# Patient Record
Sex: MALE | Race: WHITE | NOT HISPANIC OR LATINO | URBAN - METROPOLITAN AREA
[De-identification: names, ages, dates, MRNs, and addresses within clinical notes are randomized per-mention and may not be internally consistent; named-entity substitution may affect disease eponyms.]

---

## 2020-10-16 ENCOUNTER — OFFICE VISIT (OUTPATIENT)
Dept: URBAN - METROPOLITAN AREA CLINIC 19 | Facility: CLINIC | Age: 70
End: 2020-10-16
Payer: MEDICARE

## 2020-10-16 DIAGNOSIS — K74.60 UNSPECIFIED CIRRHOSIS OF LIVER: ICD-10-CM

## 2020-10-16 DIAGNOSIS — R15.9 FULL INCONTINENCE OF FECES: ICD-10-CM

## 2020-10-16 DIAGNOSIS — Z94.4 HISTORY OF LIVER TRANSPLANT: ICD-10-CM

## 2020-10-16 PROBLEM — 1086911000119107: Status: ACTIVE | Noted: 2020-10-16

## 2020-10-16 PROBLEM — 19943007: Status: ACTIVE | Noted: 2020-10-16

## 2020-10-16 PROCEDURE — G8417 CALC BMI ABV UP PARAM F/U: HCPCS | Performed by: INTERNAL MEDICINE

## 2020-10-16 PROCEDURE — 99213 OFFICE O/P EST LOW 20 MIN: CPT | Performed by: INTERNAL MEDICINE

## 2020-10-16 PROCEDURE — G8427 DOCREV CUR MEDS BY ELIG CLIN: HCPCS | Performed by: INTERNAL MEDICINE

## 2020-10-16 PROCEDURE — G9903 PT SCRN TBCO ID AS NON USER: HCPCS | Performed by: INTERNAL MEDICINE

## 2020-10-16 PROCEDURE — 3017F COLORECTAL CA SCREEN DOC REV: CPT | Performed by: INTERNAL MEDICINE

## 2020-10-16 PROCEDURE — G8484 FLU IMMUNIZE NO ADMIN: HCPCS | Performed by: INTERNAL MEDICINE

## 2020-10-16 RX ORDER — ATORVASTATIN CALCIUM 20 MG/1
1 TABLET TABLET, FILM COATED ORAL ONCE A DAY
Status: ACTIVE | COMMUNITY

## 2020-10-16 RX ORDER — DICYCLOMINE HYDROCHLORIDE 10 MG/1
1 CAPSULE CAPSULE ORAL BID
Qty: 60 CAPSULE | Refills: 5 | OUTPATIENT
Start: 2020-10-16 | End: 2021-04-14

## 2020-10-16 RX ORDER — SPIRONOLACTONE 25 MG/1
1 TABLET TABLET, FILM COATED ORAL
Status: ACTIVE | COMMUNITY

## 2020-10-16 RX ORDER — LOPERAMIDE HCL 2 MG/1
2 TABLETS TABLET, FILM COATED ORAL DAILY
Qty: 60 TABLET | Refills: 5 | OUTPATIENT
Start: 2020-10-16 | End: 2021-04-14

## 2020-10-16 RX ORDER — WHEAT DEXTRIN 3 G/3.5 G
2 SCOOPS POWDER (GRAM) ORAL DAILY
Qty: 1 CONTAINER | Refills: 5 | OUTPATIENT
Start: 2020-10-16 | End: 2021-04-14

## 2020-10-16 RX ORDER — TRAZODONE HYDROCHLORIDE 100 MG/1
TABLET, FILM COATED ORAL
Qty: 0 | Refills: 0 | Status: ACTIVE | COMMUNITY
Start: 1900-01-01

## 2020-10-16 RX ORDER — FINASTERIDE 5 MG/1
TAKE 1 TABLET (5 MG) BY ORAL ROUTE ONCE DAILY TABLET, FILM COATED ORAL 1
Qty: 0 | Refills: 0 | Status: ACTIVE | COMMUNITY
Start: 1900-01-01

## 2020-10-16 NOTE — HPI-TODAY'S VISIT:
17: Records reviewed. Patient has a complicated history, which took a while to review. He has had issues with obesity, and he underwent a philly-en-Y gastric bypass in , at which time he lost 150 pounds. His weight has fluctuated slightly, but he has maintained a reasonable weight since then. He has had problems with a ventral hernia with some complications after repair in 2016. He was hospitalized in 2017 for an anterior abdominal wall mass that was drained twice. Dr. Agustin Hoffman is managing that problem. He has mesh over the majority of his upper abdomen. He has chronic loose stools, which may be related to his gastric bypass surgery - an extensive evaluation has been done previously by Dr. Raffy Thayer. He last saw him in 2017, but he has switched his care over to me today due to convenient location. He also has seen Dr. Akash uHssein in my practice for screening/surveillance colonoscopies. His last colonoscopy was in , and he had one tubular adenoma removed. He is due for a surveillance colonoscopy at this time. In addition, he has HDZ/cryptogenic cirrhosis. He had a liver biopsy on 13 that was consistent with that diagnosis since previous laboratory testing was inconclusive. Of note, his mother  of liver disease. He has no signs of decompensation, but he has persistent thrombocytopenia/anemia. He needs screening for HCC and esophageal varices at this time. Last EGD in  did not show any evidence of portal hypertension.   18: Patient presents for a new problem - fecal incontinence. He states that for months (even before seeing me), he would have the urge to go to the bathroom but could not make it in time. Stools are usually loose or watery. May have some malabsorption. Needs liver cancer screening and hepatitis A/B vaccination.   18: Patient returns for urgent consultation. Apparently, the patient became more confused about a week after seeing me. He was slurring his speech and acting very lethargic. At the same time, he was irritable and belligerent, which apparently "not like him", according to his wife and daughter (who works in Dr. Frankel's office). He was taking naps during the day, and his driving became erratic. (No longer driving at this time.) He went to the ER twice, and he was given a rx for Xifaxan which has helped him immensely. No signs of liver cancer, ascites/SBP, or bleeding. Not sure why he became encephalopathic - awake and alert in my office.   3/26/18: Patient returns for follow-up. He was admitted from 3/3/18-3/5/18 for another exacerbation of hepatic encephalopathy. Resolved quickly with lactulose and rifaximin. He appears clear-headed today. He has swollen legs and mildly distended abdomen. Needs refills of meds. On 3/9/18, he went to see Dr. Gunner Escalante at [facility]. He is being considered by possible liver transplant.   18: Patient returns for follow-up. He feels fine except for increased swelling in his legs. Does not want to use compression stockings. Willing to try to increase diuretics with close monitoring. Seeing [facility] next month. Not listed yet - needs cardiac MRI and reimaging of a lung nodule in a few months. Up to date with colon cancer screening - I found one hyperplastic polyp and one adenomatous polyp on his last colonoscopy in 2017. I also performed an EGD at that time which did not show any varices.   18: Patient presents for follow-up of HDZ cirrhosis. On 8/10/18, I performed a screening EGD that showed evidence of a gastric bypass but no portal hypertension. Followed by Dr. Ken Escalante at [facility] - currently listed with MELD-Na of 16. Encephalopathy seems to be the main sign of decompensation. Needs HCC screening. Was vaccinated for hepatitis B. Feels tired but otherwise well.   19: Patient had a liver transplant on 3/12/19 at [facility] and follows up with Dr. Escalante. Doing amazingly well. He has followed up with hematology who were concerned about his chronic anemia (macrocytic). He has had a gastric bypass and has a chronic anemia due to chronic iron malabsorption. However, he was found to have a hemoccult positive stool. Will rule out GI blood loss.  10/16/20:  Patient presents for urgent evaluation of diarrhea.  Patient had an EGD/colonoscopy on 19 that revealed a normal philly-en-Y anatomy as well as benign lipomas in the colon without any other abnormalities.  Patient mainly is having issues with fecal incontinence, almost every other day.  He wears an adult undergarment.  It is not clear if he may have altered sensation - he just cannot make it to the commode in time.  His stools are loose - he believes that he submitted a stool sample recently that was negative for infection.  From a liver standpoint, he is still followed closely by Dr. Gunner Escalante.  He has no issues there, but his current list of meds still includes furosemide, spironolactone, lactulose, and Xifaxan.  He is not sure what pills he takes, but he does not take the lactulose anymore.  Will confirm with Dr. Escalante.

## 2020-12-15 ENCOUNTER — OFFICE VISIT (OUTPATIENT)
Dept: URBAN - METROPOLITAN AREA CLINIC 19 | Facility: CLINIC | Age: 70
End: 2020-12-15

## 2021-03-23 ENCOUNTER — TELEPHONE ENCOUNTER (OUTPATIENT)
Dept: URBAN - METROPOLITAN AREA CLINIC 19 | Facility: CLINIC | Age: 71
End: 2021-03-23

## 2021-04-02 ENCOUNTER — TELEPHONE ENCOUNTER (OUTPATIENT)
Dept: URBAN - METROPOLITAN AREA CLINIC 23 | Facility: CLINIC | Age: 71
End: 2021-04-02

## 2021-04-06 ENCOUNTER — TELEPHONE ENCOUNTER (OUTPATIENT)
Dept: URBAN - METROPOLITAN AREA CLINIC 19 | Facility: CLINIC | Age: 71
End: 2021-04-06

## 2021-04-18 PROBLEM — 161671001: Status: ACTIVE | Noted: 2020-10-16

## 2021-04-29 ENCOUNTER — WEB ENCOUNTER (OUTPATIENT)
Dept: URBAN - METROPOLITAN AREA CLINIC 19 | Facility: CLINIC | Age: 71
End: 2021-04-29

## 2021-04-29 ENCOUNTER — OFFICE VISIT (OUTPATIENT)
Dept: URBAN - METROPOLITAN AREA CLINIC 19 | Facility: CLINIC | Age: 71
End: 2021-04-29
Payer: MEDICARE

## 2021-04-29 VITALS
HEART RATE: 54 BPM | BODY MASS INDEX: 31.57 KG/M2 | SYSTOLIC BLOOD PRESSURE: 140 MMHG | WEIGHT: 246 LBS | TEMPERATURE: 98.4 F | HEIGHT: 74 IN | DIASTOLIC BLOOD PRESSURE: 77 MMHG

## 2021-04-29 DIAGNOSIS — R19.7 DIARRHEA, UNSPECIFIED TYPE: ICD-10-CM

## 2021-04-29 DIAGNOSIS — Z86.010 PERSONAL HISTORY OF COLONIC POLYPS: ICD-10-CM

## 2021-04-29 PROBLEM — 428283002: Status: ACTIVE | Noted: 2021-04-29

## 2021-04-29 PROCEDURE — 99212 OFFICE O/P EST SF 10 MIN: CPT | Performed by: NURSE PRACTITIONER

## 2021-04-29 RX ORDER — SPIRONOLACTONE 25 MG/1
1 TABLET TABLET, FILM COATED ORAL
Status: ACTIVE | COMMUNITY

## 2021-04-29 RX ORDER — DICYCLOMINE HYDROCHLORIDE 20 MG/1
1 TABLET TABLET ORAL BID
Qty: 180 TABLET | Refills: 3 | OUTPATIENT
Start: 2021-04-29 | End: 2022-04-24

## 2021-04-29 RX ORDER — FINASTERIDE 5 MG/1
TAKE 1 TABLET (5 MG) BY ORAL ROUTE ONCE DAILY TABLET, FILM COATED ORAL 1
Qty: 0 | Refills: 0 | Status: ACTIVE | COMMUNITY
Start: 1900-01-01

## 2021-04-29 RX ORDER — MYCOPHENOLIC ACID 180 MG/1
2 TABLETS TABLET, DELAYED RELEASE ORAL TWICE A DAY
Status: ACTIVE | COMMUNITY

## 2021-04-29 RX ORDER — ATORVASTATIN CALCIUM 20 MG/1
1 TABLET TABLET, FILM COATED ORAL ONCE A DAY
Status: ACTIVE | COMMUNITY

## 2021-04-29 RX ORDER — TADALAFIL 5 MG/1
1 TABLET AS NEEDED TABLET, FILM COATED ORAL ONCE A DAY
Status: ACTIVE | COMMUNITY

## 2021-04-29 RX ORDER — TACROLIMUS 1 MG/1
AS DIRECTED CAPSULE, GELATIN COATED ORAL
Status: ACTIVE | COMMUNITY

## 2021-04-29 RX ORDER — TRAZODONE HYDROCHLORIDE 100 MG/1
TABLET, FILM COATED ORAL
Qty: 0 | Refills: 0 | Status: ACTIVE | COMMUNITY
Start: 1900-01-01

## 2021-04-29 RX ORDER — DIPHENHYDRAMINE HYDROCHLORIDE 25 MG/1
1 CAPSULE AT BEDTIME AS NEEDED CAPSULE ORAL ONCE A DAY
Status: ACTIVE | COMMUNITY

## 2021-04-29 RX ORDER — ZINC SULFATE 50(220)MG
1 CAPSULE CAPSULE ORAL ONCE A DAY
Status: ACTIVE | COMMUNITY

## 2021-05-05 ENCOUNTER — TELEPHONE ENCOUNTER (OUTPATIENT)
Dept: URBAN - METROPOLITAN AREA CLINIC 19 | Facility: CLINIC | Age: 71
End: 2021-05-05

## 2021-05-13 ENCOUNTER — OFFICE VISIT (OUTPATIENT)
Dept: URBAN - METROPOLITAN AREA MEDICAL CENTER 28 | Facility: MEDICAL CENTER | Age: 71
End: 2021-05-13
Payer: MEDICARE

## 2021-05-13 DIAGNOSIS — Z98.0 H/O BILLROTH II OPERATION: ICD-10-CM

## 2021-05-13 DIAGNOSIS — R93.3 ABN FINDINGS-GI TRACT: ICD-10-CM

## 2021-05-13 PROCEDURE — 43237 ENDOSCOPIC US EXAM ESOPH: CPT | Performed by: INTERNAL MEDICINE

## 2022-05-09 ENCOUNTER — ERX REFILL RESPONSE (OUTPATIENT)
Dept: URBAN - METROPOLITAN AREA CLINIC 19 | Facility: CLINIC | Age: 72
End: 2022-05-09

## 2022-05-09 ENCOUNTER — TELEPHONE ENCOUNTER (OUTPATIENT)
Dept: URBAN - METROPOLITAN AREA CLINIC 19 | Facility: CLINIC | Age: 72
End: 2022-05-09

## 2022-05-09 RX ORDER — DICYCLOMINE HYDROCHLORIDE 20 MG/1
TAKE 1 TABLET TWICE A DAY ORALLY TABLET ORAL
Qty: 60 TABLET | Refills: 4 | OUTPATIENT

## 2022-05-09 RX ORDER — DICYCLOMINE HYDROCHLORIDE 20 MG/1
TAKE 1 TABLET TWICE A DAY ORALLY TABLET ORAL
Qty: 60 TABLET | Refills: 1 | OUTPATIENT

## 2022-06-08 ENCOUNTER — TELEPHONE ENCOUNTER (OUTPATIENT)
Dept: URBAN - METROPOLITAN AREA CLINIC 19 | Facility: CLINIC | Age: 72
End: 2022-06-08

## 2022-06-08 RX ORDER — DICYCLOMINE HYDROCHLORIDE 20 MG/1
TAKE 1 TABLET TWICE A DAY ORALLY TABLET ORAL BID
Qty: 60 | Refills: 1

## 2022-08-04 ENCOUNTER — LAB OUTSIDE AN ENCOUNTER (OUTPATIENT)
Dept: URBAN - METROPOLITAN AREA CLINIC 19 | Facility: CLINIC | Age: 72
End: 2022-08-04

## 2022-08-04 ENCOUNTER — OFFICE VISIT (OUTPATIENT)
Dept: URBAN - METROPOLITAN AREA CLINIC 19 | Facility: CLINIC | Age: 72
End: 2022-08-04
Payer: MEDICARE

## 2022-08-04 ENCOUNTER — WEB ENCOUNTER (OUTPATIENT)
Dept: URBAN - METROPOLITAN AREA CLINIC 19 | Facility: CLINIC | Age: 72
End: 2022-08-04

## 2022-08-04 VITALS
TEMPERATURE: 98.6 F | WEIGHT: 233 LBS | DIASTOLIC BLOOD PRESSURE: 82 MMHG | HEIGHT: 74 IN | BODY MASS INDEX: 29.9 KG/M2 | SYSTOLIC BLOOD PRESSURE: 120 MMHG

## 2022-08-04 DIAGNOSIS — R19.7 DIARRHEA: ICD-10-CM

## 2022-08-04 DIAGNOSIS — Z12.11 COLON CANCER SCREENING: ICD-10-CM

## 2022-08-04 PROCEDURE — 99213 OFFICE O/P EST LOW 20 MIN: CPT | Performed by: NURSE PRACTITIONER

## 2022-08-04 RX ORDER — TADALAFIL 5 MG/1
1 TABLET AS NEEDED TABLET, FILM COATED ORAL ONCE A DAY
Status: ACTIVE | COMMUNITY

## 2022-08-04 RX ORDER — SPIRONOLACTONE 25 MG/1
1 TABLET TABLET, FILM COATED ORAL
Status: ACTIVE | COMMUNITY

## 2022-08-04 RX ORDER — DICYCLOMINE HYDROCHLORIDE 20 MG/1
TAKE 1 TABLET TWICE A DAY ORALLY TABLET ORAL BID
Qty: 60 | Refills: 1 | Status: ACTIVE | COMMUNITY

## 2022-08-04 RX ORDER — ZINC SULFATE 50(220)MG
1 CAPSULE CAPSULE ORAL ONCE A DAY
Status: ACTIVE | COMMUNITY

## 2022-08-04 RX ORDER — MYCOPHENOLIC ACID 180 MG/1
2 TABLETS TABLET, DELAYED RELEASE ORAL TWICE A DAY
Status: ON HOLD | COMMUNITY

## 2022-08-04 RX ORDER — DICYCLOMINE HYDROCHLORIDE 20 MG/1
1 TABLET TABLET ORAL TWICE A DAY
Qty: 180 TABLET | Refills: 1 | OUTPATIENT
Start: 2022-08-04 | End: 2023-01-30

## 2022-08-04 RX ORDER — DIPHENHYDRAMINE HYDROCHLORIDE 25 MG/1
1 CAPSULE AT BEDTIME AS NEEDED CAPSULE ORAL ONCE A DAY
Status: ACTIVE | COMMUNITY

## 2022-08-04 RX ORDER — TACROLIMUS 1 MG/1
AS DIRECTED CAPSULE, GELATIN COATED ORAL
Status: ACTIVE | COMMUNITY

## 2022-08-04 RX ORDER — ATORVASTATIN CALCIUM 20 MG/1
1 TABLET TABLET, FILM COATED ORAL ONCE A DAY
Status: ACTIVE | COMMUNITY

## 2022-08-04 RX ORDER — FINASTERIDE 5 MG/1
TAKE 1 TABLET (5 MG) BY ORAL ROUTE ONCE DAILY TABLET, FILM COATED ORAL 1
Qty: 0 | Refills: 0 | Status: ACTIVE | COMMUNITY
Start: 1900-01-01

## 2022-08-04 RX ORDER — TRAZODONE HYDROCHLORIDE 100 MG/1
TABLET, FILM COATED ORAL
Qty: 0 | Refills: 0 | Status: ON HOLD | COMMUNITY
Start: 1900-01-01

## 2022-08-04 RX ORDER — SODIUM, POTASSIUM,MAG SULFATES 17.5-3.13G
354ML SOLUTION, RECONSTITUTED, ORAL ORAL
Qty: 354 MILLILITER | Refills: 0 | OUTPATIENT
Start: 2022-08-04 | End: 2022-08-05

## 2022-08-04 NOTE — HPI-TODAY'S VISIT:
17 Dr. Santoyo : Records reviewed. Patient has a complicated history, which took a while to review. He has had issues with obesity, and he underwent a philly-en-Y gastric bypass in , at which time he lost 150 pounds. His weight has fluctuated slightly, but he has maintained a reasonable weight since then. He has had problems with a ventral hernia with some complications after repair in 2016. He was hospitalized in 2017 for an anterior abdominal wall mass that was drained twice. Dr. Agustin Hoffman is managing that problem. He has mesh over the majority of his upper abdomen. He has chronic loose stools, which may be related to his gastric bypass surgery - an extensive evaluation has been done previously by Dr. Raffy Thayer. He last saw him in 2017, but he has switched his care over to me today due to convenient location. He also has seen Dr. Akash Hussein in my practice for screening/surveillance colonoscopies. His last colonoscopy was in , and he had one tubular adenoma removed. He is due for a surveillance colonoscopy at this time. In addition, he has HDZ/cryptogenic cirrhosis. He had a liver biopsy on 13 that was consistent with that diagnosis since previous laboratory testing was inconclusive. Of note, his mother  of liver disease. He has no signs of decompensation, but he has persistent thrombocytopenia/anemia. He needs screening for HCC and esophageal varices at this time. Last EGD in  did not show any evidence of portal hypertension.   18: Patient presents for a new problem - fecal incontinence. He states that for months (even before seeing me), he would have the urge to go to the bathroom but could not make it in time. Stools are usually loose or watery. May have some malabsorption. Needs liver cancer screening and hepatitis A/B vaccination.   18: Patient returns for urgent consultation. Apparently, the patient became more confused about a week after seeing me. He was slurring his speech and acting very lethargic. At the same time, he was irritable and belligerent, which apparently "not like him", according to his wife and daughter (who works in Dr. Frankel's office). He was taking naps during the day, and his driving became erratic. (No longer driving at this time.) He went to the ER twice, and he was given a rx for Xifaxan which has helped him immensely. No signs of liver cancer, ascites/SBP, or bleeding. Not sure why he became encephalopathic - awake and alert in my office.   3/26/18: Patient returns for follow-up. He was admitted from 3/3/18-3/5/18 for another exacerbation of hepatic encephalopathy. Resolved quickly with lactulose and rifaximin. He appears clear-headed today. He has swollen legs and mildly distended abdomen. Needs refills of meds. On 3/9/18, he went to see Dr. Gunner Escalante at [facility]. He is being considered by possible liver transplant.   18: Patient returns for follow-up. He feels fine except for increased swelling in his legs. Does not want to use compression stockings. Willing to try to increase diuretics with close monitoring. Seeing [facility] next month. Not listed yet - needs cardiac MRI and reimaging of a lung nodule in a few months. Up to date with colon cancer screening - I found one hyperplastic polyp and one adenomatous polyp on his last colonoscopy in 2017. I also performed an EGD at that time which did not show any varices.   18: Patient presents for follow-up of HDZ cirrhosis. On 8/10/18, I performed a screening EGD that showed evidence of a gastric bypass but no portal hypertension. Followed by Dr. Ken Escalante at [facility] - currently listed with MELD-Na of 16. Encephalopathy seems to be the main sign of decompensation. Needs HCC screening. Was vaccinated for hepatitis B. Feels tired but otherwise well.   19: Patient had a liver transplant on 3/12/19 at [facility] and follows up with Dr. Escalante. Doing amazingly well. He has followed up with hematology who were concerned about his chronic anemia (macrocytic). He has had a gastric bypass and has a chronic anemia due to chronic iron malabsorption. However, he was found to have a hemoccult positive stool. Will rule out GI blood loss.  10/16/20:  Patient presents for urgent evaluation of diarrhea.  Patient had an EGD/colonoscopy on 19 that revealed a normal philly-en-Y anatomy as well as benign lipomas in the colon without any other abnormalities.  Patient mainly is having issues with fecal incontinence, almost every other day.  He wears an adult undergarment.  It is not clear if he may have altered sensation - he just cannot make it to the commode in time.  His stools are loose - he believes that he submitted a stool sample recently that was negative for infection.  From a liver standpoint, he is still followed closely by Dr. Gunner Escalante.  He has no issues there, but his current list of meds still includes furosemide, spironolactone, lactulose, and Xifaxan.  He is not sure what pills he takes, but he does not take the lactulose anymore.  Will confirm with Dr. Escalante.  21: Today, Mr. Rivers presents for a colon cancer screening/medication refill. His last colonoscopy was 2019. He had "many small -mouthed diverticula" in the sigmoid and descending colon. There was also one large lipoma in the descending colon as well as one small lipoma in the transverse colon. The rest of the exam was normal. He is not due at this time for a colonoscopy. He reports he is doing well on his medication for diarrhea. He is having 3-4 bowel movements a day, which are formed. He is taking imodium and dicyclomine. His wife is wondering if he can get a 90 day prescription for the dicyclomine. He is having an EUS with Dr. Santoyo in two weeks.  22: Mr. Rivers is following up today for diarrhea.  Since he was seen here last he had an EUS with Dr. Santoyo on 2021 which showed normal esophagus.  Gastric bypass with normal-sized pouch.  Gastrojejunal anastomosis characterized by healthy-appearing mucosa.  Normal examined jejunum.  Endoscopic images of stomach were unremarkable.  No specimens collected. He reports he still has diarrhea occasionally with incontinence.  He is taking 1 Imodium in the morning and at night.  He also takes dicyclomine twice daily.  Currently taking probiotics.  Having a bowel movement 3-4 times a week although he states it depends on the day.  No blood in stool.  No abdominal pain.  Patient states he was advised from his liver transplant physician to have a colonoscopy.

## 2022-09-07 ENCOUNTER — OFFICE VISIT (OUTPATIENT)
Dept: URBAN - METROPOLITAN AREA MEDICAL CENTER 25 | Facility: MEDICAL CENTER | Age: 72
End: 2022-09-07

## 2022-09-08 ENCOUNTER — OFFICE VISIT (OUTPATIENT)
Dept: URBAN - METROPOLITAN AREA MEDICAL CENTER 25 | Facility: MEDICAL CENTER | Age: 72
End: 2022-09-08

## 2022-11-16 PROBLEM — 305058001: Status: ACTIVE | Noted: 2022-08-04

## 2022-12-23 ENCOUNTER — OFFICE VISIT (OUTPATIENT)
Dept: URBAN - METROPOLITAN AREA MEDICAL CENTER 25 | Facility: MEDICAL CENTER | Age: 72
End: 2022-12-23
Payer: MEDICARE

## 2022-12-23 DIAGNOSIS — D12.3 ADENOMA OF TRANSVERSE COLON: ICD-10-CM

## 2022-12-23 DIAGNOSIS — Z12.11 COLON CANCER SCREENING: ICD-10-CM

## 2022-12-23 PROCEDURE — 45380 COLONOSCOPY AND BIOPSY: CPT | Performed by: INTERNAL MEDICINE

## 2022-12-23 RX ORDER — TADALAFIL 5 MG/1
1 TABLET AS NEEDED TABLET, FILM COATED ORAL ONCE A DAY
Status: ACTIVE | COMMUNITY

## 2022-12-23 RX ORDER — ZINC SULFATE 50(220)MG
1 CAPSULE CAPSULE ORAL ONCE A DAY
Status: ACTIVE | COMMUNITY

## 2022-12-23 RX ORDER — SPIRONOLACTONE 25 MG/1
1 TABLET TABLET, FILM COATED ORAL
Status: ACTIVE | COMMUNITY

## 2022-12-23 RX ORDER — DIPHENHYDRAMINE HYDROCHLORIDE 25 MG/1
1 CAPSULE AT BEDTIME AS NEEDED CAPSULE ORAL ONCE A DAY
Status: ACTIVE | COMMUNITY

## 2022-12-23 RX ORDER — TRAZODONE HYDROCHLORIDE 100 MG/1
TABLET, FILM COATED ORAL
Qty: 0 | Refills: 0 | Status: ON HOLD | COMMUNITY
Start: 1900-01-01

## 2022-12-23 RX ORDER — TACROLIMUS 1 MG/1
AS DIRECTED CAPSULE, GELATIN COATED ORAL
Status: ACTIVE | COMMUNITY

## 2022-12-23 RX ORDER — ATORVASTATIN CALCIUM 20 MG/1
1 TABLET TABLET, FILM COATED ORAL ONCE A DAY
Status: ACTIVE | COMMUNITY

## 2022-12-23 RX ORDER — FINASTERIDE 5 MG/1
TAKE 1 TABLET (5 MG) BY ORAL ROUTE ONCE DAILY TABLET, FILM COATED ORAL 1
Qty: 0 | Refills: 0 | Status: ACTIVE | COMMUNITY
Start: 1900-01-01

## 2022-12-23 RX ORDER — DICYCLOMINE HYDROCHLORIDE 20 MG/1
TAKE 1 TABLET TWICE A DAY ORALLY TABLET ORAL BID
Qty: 60 | Refills: 1 | Status: ACTIVE | COMMUNITY

## 2022-12-23 RX ORDER — DICYCLOMINE HYDROCHLORIDE 20 MG/1
1 TABLET TABLET ORAL TWICE A DAY
Qty: 180 TABLET | Refills: 1 | Status: ACTIVE | COMMUNITY
Start: 2022-08-04 | End: 2023-01-30

## 2022-12-23 RX ORDER — MYCOPHENOLIC ACID 180 MG/1
2 TABLETS TABLET, DELAYED RELEASE ORAL TWICE A DAY
Status: ON HOLD | COMMUNITY

## 2024-01-12 ENCOUNTER — TELEPHONE ENCOUNTER (OUTPATIENT)
Dept: URBAN - METROPOLITAN AREA CLINIC 19 | Facility: CLINIC | Age: 74
End: 2024-01-12

## 2024-01-16 ENCOUNTER — DASHBOARD ENCOUNTERS (OUTPATIENT)
Age: 74
End: 2024-01-16

## 2024-01-16 ENCOUNTER — LAB OUTSIDE AN ENCOUNTER (OUTPATIENT)
Dept: URBAN - METROPOLITAN AREA CLINIC 19 | Facility: CLINIC | Age: 74
End: 2024-01-16

## 2024-01-16 ENCOUNTER — OFFICE VISIT (OUTPATIENT)
Dept: URBAN - METROPOLITAN AREA CLINIC 19 | Facility: CLINIC | Age: 74
End: 2024-01-16
Payer: MEDICARE

## 2024-01-16 ENCOUNTER — OFFICE VISIT (OUTPATIENT)
Dept: URBAN - METROPOLITAN AREA CLINIC 19 | Facility: CLINIC | Age: 74
End: 2024-01-16

## 2024-01-16 VITALS
TEMPERATURE: 97.1 F | HEIGHT: 74 IN | HEART RATE: 62 BPM | WEIGHT: 244 LBS | BODY MASS INDEX: 31.32 KG/M2 | SYSTOLIC BLOOD PRESSURE: 120 MMHG | DIASTOLIC BLOOD PRESSURE: 80 MMHG

## 2024-01-16 DIAGNOSIS — R10.13 EPIGASTRIC PAIN: ICD-10-CM

## 2024-01-16 DIAGNOSIS — Z94.4 HISTORY OF LIVER TRANSPLANT: ICD-10-CM

## 2024-01-16 DIAGNOSIS — R93.3 ABNORMAL CT SCAN, STOMACH: ICD-10-CM

## 2024-01-16 DIAGNOSIS — K74.60 UNSPECIFIED CIRRHOSIS OF LIVER: ICD-10-CM

## 2024-01-16 DIAGNOSIS — K75.81 NONALCOHOLIC STEATOHEPATITIS (NASH): ICD-10-CM

## 2024-01-16 DIAGNOSIS — Z98.84 HISTORY OF ROUX-EN-Y GASTRIC BYPASS: ICD-10-CM

## 2024-01-16 PROCEDURE — 99213 OFFICE O/P EST LOW 20 MIN: CPT | Performed by: INTERNAL MEDICINE

## 2024-01-16 RX ORDER — ATORVASTATIN CALCIUM 20 MG/1
1 TABLET TABLET, FILM COATED ORAL ONCE A DAY
Status: ACTIVE | COMMUNITY

## 2024-01-16 RX ORDER — TRAZODONE HYDROCHLORIDE 100 MG/1
TABLET, FILM COATED ORAL
Qty: 0 | Refills: 0 | Status: ON HOLD | COMMUNITY
Start: 1900-01-01

## 2024-01-16 RX ORDER — SPIRONOLACTONE 25 MG/1
1 TABLET TABLET, FILM COATED ORAL
Status: ACTIVE | COMMUNITY

## 2024-01-16 RX ORDER — DICYCLOMINE HYDROCHLORIDE 20 MG/1
TAKE 1 TABLET TWICE A DAY ORALLY TABLET ORAL BID
Qty: 60 | Refills: 1 | Status: ACTIVE | COMMUNITY

## 2024-01-16 RX ORDER — MYCOPHENOLIC ACID 180 MG/1
2 TABLETS TABLET, DELAYED RELEASE ORAL TWICE A DAY
Status: ON HOLD | COMMUNITY

## 2024-01-16 RX ORDER — TADALAFIL 5 MG/1
1 TABLET AS NEEDED TABLET, FILM COATED ORAL ONCE A DAY
Status: ACTIVE | COMMUNITY

## 2024-01-16 RX ORDER — TACROLIMUS 1 MG/1
AS DIRECTED CAPSULE, GELATIN COATED ORAL
Status: ACTIVE | COMMUNITY

## 2024-01-16 RX ORDER — FINASTERIDE 5 MG/1
TAKE 1 TABLET (5 MG) BY ORAL ROUTE ONCE DAILY TABLET, FILM COATED ORAL 1
Qty: 0 | Refills: 0 | Status: ACTIVE | COMMUNITY
Start: 1900-01-01

## 2024-01-16 RX ORDER — OMEPRAZOLE 40 MG/1
1 CAPSULE 30 MINUTES BEFORE MORNING MEAL CAPSULE, DELAYED RELEASE ORAL ONCE A DAY
Qty: 30 | Refills: 1 | OUTPATIENT
Start: 2024-01-16

## 2024-01-16 RX ORDER — ZINC SULFATE 50(220)MG
1 CAPSULE CAPSULE ORAL ONCE A DAY
Status: ACTIVE | COMMUNITY

## 2024-01-16 RX ORDER — SUCRALFATE 1 G/10ML
10 ML 1 HOUR BEFORE MEALS AND AT BEDTIME ON AN EMPTY STOMACH SUSPENSION ORAL TWICE A DAY
Qty: 600 | Refills: 1 | OUTPATIENT
Start: 2024-01-16 | End: 2024-03-16

## 2024-01-16 RX ORDER — DIPHENHYDRAMINE HYDROCHLORIDE 25 MG/1
1 CAPSULE AT BEDTIME AS NEEDED CAPSULE ORAL ONCE A DAY
Status: ACTIVE | COMMUNITY

## 2024-01-16 NOTE — HPI-TODAY'S VISIT:
17 Dr. Santoyo : Records reviewed. Patient has a complicated history, which took a while to review. He has had issues with obesity, and he underwent a philly-en-Y gastric bypass in , at which time he lost 150 pounds. His weight has fluctuated slightly, but he has maintained a reasonable weight since then. He has had problems with a ventral hernia with some complications after repair in 2016. He was hospitalized in 2017 for an anterior abdominal wall mass that was drained twice. Dr. Agustin Hoffman is managing that problem. He has mesh over the majority of his upper abdomen. He has chronic loose stools, which may be related to his gastric bypass surgery - an extensive evaluation has been done previously by Dr. Raffy Thayer. He last saw him in 2017, but he has switched his care over to me today due to convenient location. He also has seen Dr. Akash Hussein in my practice for screening/surveillance colonoscopies. His last colonoscopy was in , and he had one tubular adenoma removed. He is due for a surveillance colonoscopy at this time. In addition, he has HDZ/cryptogenic cirrhosis. He had a liver biopsy on 13 that was consistent with that diagnosis since previous laboratory testing was inconclusive. Of note, his mother  of liver disease. He has no signs of decompensation, but he has persistent thrombocytopenia/anemia. He needs screening for HCC and esophageal varices at this time. Last EGD in  did not show any evidence of portal hypertension.   18: Patient presents for a new problem - fecal incontinence. He states that for months (even before seeing me), he would have the urge to go to the bathroom but could not make it in time. Stools are usually loose or watery. May have some malabsorption. Needs liver cancer screening and hepatitis A/B vaccination.   18: Patient returns for urgent consultation. Apparently, the patient became more confused about a week after seeing me. He was slurring his speech and acting very lethargic. At the same time, he was irritable and belligerent, which apparently "not like him", according to his wife and daughter (who works in Dr. Frankel's office). He was taking naps during the day, and his driving became erratic. (No longer driving at this time.) He went to the ER twice, and he was given a rx for Xifaxan which has helped him immensely. No signs of liver cancer, ascites/SBP, or bleeding. Not sure why he became encephalopathic - awake and alert in my office.   3/26/18: Patient returns for follow-up. He was admitted from 3/3/18-3/5/18 for another exacerbation of hepatic encephalopathy. Resolved quickly with lactulose and rifaximin. He appears clear-headed today. He has swollen legs and mildly distended abdomen. Needs refills of meds. On 3/9/18, he went to see Dr. Gunner Escalante at [facility]. He is being considered by possible liver transplant.   18: Patient returns for follow-up. He feels fine except for increased swelling in his legs. Does not want to use compression stockings. Willing to try to increase diuretics with close monitoring. Seeing [facility] next month. Not listed yet - needs cardiac MRI and reimaging of a lung nodule in a few months. Up to date with colon cancer screening - I found one hyperplastic polyp and one adenomatous polyp on his last colonoscopy in 2017. I also performed an EGD at that time which did not show any varices.   18: Patient presents for follow-up of HDZ cirrhosis. On 8/10/18, I performed a screening EGD that showed evidence of a gastric bypass but no portal hypertension. Followed by Dr. Ken Escalante at [facility] - currently listed with MELD-Na of 16. Encephalopathy seems to be the main sign of decompensation. Needs HCC screening. Was vaccinated for hepatitis B. Feels tired but otherwise well.   19: Patient had a liver transplant on 3/12/19 at [facility] and follows up with Dr. Escalante. Doing amazingly well. He has followed up with hematology who were concerned about his chronic anemia (macrocytic). He has had a gastric bypass and has a chronic anemia due to chronic iron malabsorption. However, he was found to have a hemoccult positive stool. Will rule out GI blood loss.  10/16/20:  Patient presents for urgent evaluation of diarrhea.  Patient had an EGD/colonoscopy on 19 that revealed a normal philly-en-Y anatomy as well as benign lipomas in the colon without any other abnormalities.  Patient mainly is having issues with fecal incontinence, almost every other day.  He wears an adult undergarment.  It is not clear if he may have altered sensation - he just cannot make it to the commode in time.  His stools are loose - he believes that he submitted a stool sample recently that was negative for infection.  From a liver standpoint, he is still followed closely by Dr. Gunner Escalante.  He has no issues there, but his current list of meds still includes furosemide, spironolactone, lactulose, and Xifaxan.  He is not sure what pills he takes, but he does not take the lactulose anymore.  Will confirm with Dr. Escalante.  21: Today, Mr. Rivers presents for a colon cancer screening/medication refill. His last colonoscopy was 2019. He had "many small -mouthed diverticula" in the sigmoid and descending colon. There was also one large lipoma in the descending colon as well as one small lipoma in the transverse colon. The rest of the exam was normal. He is not due at this time for a colonoscopy. He reports he is doing well on his medication for diarrhea. He is having 3-4 bowel movements a day, which are formed. He is taking imodium and dicyclomine. His wife is wondering if he can get a 90 day prescription for the dicyclomine. He is having an EUS with Dr. Santoyo in two weeks.  22: Mr. Rivers is following up today for diarrhea.  Since he was seen here last he had an EUS with Dr. Santoyo on 2021 which showed normal esophagus.  Gastric bypass with normal-sized pouch.  Gastrojejunal anastomosis characterized by healthy-appearing mucosa.  Normal examined jejunum.  Endoscopic images of stomach were unremarkable.  No specimens collected. He reports he still has diarrhea occasionally with incontinence.  He is taking 1 Imodium in the morning and at night.  He also takes dicyclomine twice daily.  Currently taking probiotics.  Having a bowel movement 3-4 times a week although he states it depends on the day.  No blood in stool.  No abdominal pain.  Patient states he was advised from his liver transplant physician to have a colonoscopy.  24: Patient presents to my clinic at the request of his transplant hepatologist, Dr. Jeannette Mauricio. The patient had an OLT on 3/12/19 and has done well since then. Records reviewed - last saw Dr. Mauricio in clinic on 23. Over the past couple of weeks, however, he has had some epigastric pain. Imaging showed a possible ulcer in the stomach. Looking at past records, I had done an EGD/EUS in May 2021 that did not show an ulcer. 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 However, the CT from 24 suggests that there is gastric wall thickening in the antrum that is suggestive of malignancy.

## 2024-02-09 ENCOUNTER — EGD (OUTPATIENT)
Dept: URBAN - METROPOLITAN AREA MEDICAL CENTER 25 | Facility: MEDICAL CENTER | Age: 74
End: 2024-02-09
Payer: MEDICARE

## 2024-02-09 DIAGNOSIS — K29.60 ADENOPAPILLOMATOSIS GASTRICA: ICD-10-CM

## 2024-02-09 DIAGNOSIS — K91.89 AFFERENT LOOP SYNDROME: ICD-10-CM

## 2024-02-09 DIAGNOSIS — K31.6 ACQUIRED GASTRIC FISTULA: ICD-10-CM

## 2024-02-09 PROBLEM — 76796008: Status: ACTIVE | Noted: 2024-02-09

## 2024-02-09 PROCEDURE — 43239 EGD BIOPSY SINGLE/MULTIPLE: CPT | Performed by: INTERNAL MEDICINE

## 2024-02-09 RX ORDER — TACROLIMUS 1 MG/1
AS DIRECTED CAPSULE, GELATIN COATED ORAL
Status: ACTIVE | COMMUNITY

## 2024-02-09 RX ORDER — MYCOPHENOLIC ACID 180 MG/1
2 TABLETS TABLET, DELAYED RELEASE ORAL TWICE A DAY
Status: ON HOLD | COMMUNITY

## 2024-02-09 RX ORDER — DIPHENHYDRAMINE HYDROCHLORIDE 25 MG/1
1 CAPSULE AT BEDTIME AS NEEDED CAPSULE ORAL ONCE A DAY
Status: ACTIVE | COMMUNITY

## 2024-02-09 RX ORDER — OMEPRAZOLE 40 MG/1
1 CAPSULE 30 MINUTES BEFORE MORNING MEAL CAPSULE, DELAYED RELEASE ORAL ONCE A DAY
Qty: 30 | Refills: 1 | Status: ACTIVE | COMMUNITY
Start: 2024-01-16

## 2024-02-09 RX ORDER — TRAZODONE HYDROCHLORIDE 100 MG/1
TABLET, FILM COATED ORAL
Qty: 0 | Refills: 0 | Status: ON HOLD | COMMUNITY
Start: 1900-01-01

## 2024-02-09 RX ORDER — FINASTERIDE 5 MG/1
TAKE 1 TABLET (5 MG) BY ORAL ROUTE ONCE DAILY TABLET, FILM COATED ORAL 1
Qty: 0 | Refills: 0 | Status: ACTIVE | COMMUNITY
Start: 1900-01-01

## 2024-02-09 RX ORDER — ATORVASTATIN CALCIUM 20 MG/1
1 TABLET TABLET, FILM COATED ORAL ONCE A DAY
Status: ACTIVE | COMMUNITY

## 2024-02-09 RX ORDER — SPIRONOLACTONE 25 MG/1
1 TABLET TABLET, FILM COATED ORAL
Status: ACTIVE | COMMUNITY

## 2024-02-09 RX ORDER — SUCRALFATE 1 G/10ML
10 ML 1 HOUR BEFORE MEALS AND AT BEDTIME ON AN EMPTY STOMACH SUSPENSION ORAL TWICE A DAY
Qty: 600 | Refills: 1 | Status: ACTIVE | COMMUNITY
Start: 2024-01-16 | End: 2024-03-16

## 2024-02-09 RX ORDER — DICYCLOMINE HYDROCHLORIDE 20 MG/1
TAKE 1 TABLET TWICE A DAY ORALLY TABLET ORAL BID
Qty: 60 | Refills: 1 | Status: ACTIVE | COMMUNITY

## 2024-02-09 RX ORDER — TADALAFIL 5 MG/1
1 TABLET AS NEEDED TABLET, FILM COATED ORAL ONCE A DAY
Status: ACTIVE | COMMUNITY

## 2024-02-09 RX ORDER — ZINC SULFATE 50(220)MG
1 CAPSULE CAPSULE ORAL ONCE A DAY
Status: ACTIVE | COMMUNITY

## 2024-04-04 ENCOUNTER — OV EP (OUTPATIENT)
Dept: URBAN - METROPOLITAN AREA CLINIC 19 | Facility: CLINIC | Age: 74
End: 2024-04-04

## 2024-04-18 ENCOUNTER — OV EP (OUTPATIENT)
Dept: URBAN - METROPOLITAN AREA CLINIC 19 | Facility: CLINIC | Age: 74
End: 2024-04-18

## 2024-05-10 ENCOUNTER — OFFICE VISIT (OUTPATIENT)
Dept: URBAN - METROPOLITAN AREA CLINIC 19 | Facility: CLINIC | Age: 74
End: 2024-05-10

## 2024-05-21 ENCOUNTER — CLAIMS CREATED FROM THE CLAIM WINDOW (OUTPATIENT)
Dept: URBAN - METROPOLITAN AREA CLINIC 19 | Facility: CLINIC | Age: 74
End: 2024-05-21

## 2024-05-21 ENCOUNTER — OFFICE VISIT (OUTPATIENT)
Dept: URBAN - METROPOLITAN AREA CLINIC 19 | Facility: CLINIC | Age: 74
End: 2024-05-21
Payer: MEDICARE

## 2024-05-21 ENCOUNTER — LAB OUTSIDE AN ENCOUNTER (OUTPATIENT)
Dept: URBAN - METROPOLITAN AREA CLINIC 19 | Facility: CLINIC | Age: 74
End: 2024-05-21

## 2024-05-21 VITALS
BODY MASS INDEX: 32.6 KG/M2 | HEART RATE: 48 BPM | SYSTOLIC BLOOD PRESSURE: 131 MMHG | HEIGHT: 74 IN | TEMPERATURE: 98.1 F | OXYGEN SATURATION: 96 % | WEIGHT: 254 LBS | DIASTOLIC BLOOD PRESSURE: 91 MMHG

## 2024-05-21 DIAGNOSIS — K27.9 PEPTIC ULCER: ICD-10-CM

## 2024-05-21 PROCEDURE — 99213 OFFICE O/P EST LOW 20 MIN: CPT | Performed by: NURSE PRACTITIONER

## 2024-05-21 RX ORDER — OMEPRAZOLE 40 MG/1
1 CAPSULE 30 MINUTES BEFORE MORNING MEAL CAPSULE, DELAYED RELEASE ORAL ONCE A DAY
Qty: 30 | Refills: 1 | Status: ACTIVE | COMMUNITY
Start: 2024-01-16

## 2024-05-21 RX ORDER — FINASTERIDE 5 MG/1
TAKE 1 TABLET (5 MG) BY ORAL ROUTE ONCE DAILY TABLET, FILM COATED ORAL 1
Qty: 0 | Refills: 0 | Status: ACTIVE | COMMUNITY
Start: 1900-01-01

## 2024-05-21 RX ORDER — TADALAFIL 5 MG/1
1 TABLET AS NEEDED TABLET, FILM COATED ORAL ONCE A DAY
Status: ACTIVE | COMMUNITY

## 2024-05-21 RX ORDER — DIPHENHYDRAMINE HYDROCHLORIDE 25 MG/1
1 CAPSULE AT BEDTIME AS NEEDED CAPSULE ORAL ONCE A DAY
Status: ACTIVE | COMMUNITY

## 2024-05-21 RX ORDER — MYCOPHENOLIC ACID 180 MG/1
2 TABLETS TABLET, DELAYED RELEASE ORAL TWICE A DAY
Status: ON HOLD | COMMUNITY

## 2024-05-21 RX ORDER — DICYCLOMINE HYDROCHLORIDE 20 MG/1
TAKE 1 TABLET TWICE A DAY ORALLY TABLET ORAL BID
Qty: 60 | Refills: 1 | Status: ACTIVE | COMMUNITY

## 2024-05-21 RX ORDER — TACROLIMUS 1 MG/1
AS DIRECTED CAPSULE, GELATIN COATED ORAL
Status: ACTIVE | COMMUNITY

## 2024-05-21 RX ORDER — SPIRONOLACTONE 25 MG/1
1 TABLET TABLET, FILM COATED ORAL
Status: ACTIVE | COMMUNITY

## 2024-05-21 RX ORDER — OMEPRAZOLE 40 MG/1
1 CAPSULE 30 MINUTES BEFORE MORNING AND EVENING MEALS CAPSULE, DELAYED RELEASE ORAL TWICE A DAY
Qty: 60 | Refills: 3 | Status: ACTIVE | COMMUNITY
Start: 2024-02-09

## 2024-05-21 RX ORDER — ZINC SULFATE 50(220)MG
1 CAPSULE CAPSULE ORAL ONCE A DAY
Status: ACTIVE | COMMUNITY

## 2024-05-21 RX ORDER — ATORVASTATIN CALCIUM 20 MG/1
1 TABLET TABLET, FILM COATED ORAL ONCE A DAY
Status: ACTIVE | COMMUNITY

## 2024-05-21 RX ORDER — TRAZODONE HYDROCHLORIDE 100 MG/1
TABLET, FILM COATED ORAL
Qty: 0 | Refills: 0 | Status: ON HOLD | COMMUNITY
Start: 1900-01-01

## 2024-07-11 ENCOUNTER — TELEPHONE ENCOUNTER (OUTPATIENT)
Dept: URBAN - METROPOLITAN AREA CLINIC 103 | Facility: CLINIC | Age: 74
End: 2024-07-11

## 2024-07-15 ENCOUNTER — OFFICE VISIT (OUTPATIENT)
Dept: URBAN - METROPOLITAN AREA SURGERY CENTER 31 | Facility: SURGERY CENTER | Age: 74
End: 2024-07-15
Payer: MEDICARE

## 2024-07-15 DIAGNOSIS — K25.7 ANTRAL ULCER, CHRONIC: ICD-10-CM

## 2024-07-15 DIAGNOSIS — K31.6 GASTRIC FISTULA: ICD-10-CM

## 2024-07-15 PROCEDURE — 43235 EGD DIAGNOSTIC BRUSH WASH: CPT | Performed by: INTERNAL MEDICINE

## 2024-07-15 PROCEDURE — 00731 ANES UPR GI NDSC PX NOS: CPT | Performed by: NURSE ANESTHETIST, CERTIFIED REGISTERED

## 2024-07-15 RX ORDER — FINASTERIDE 5 MG/1
TAKE 1 TABLET (5 MG) BY ORAL ROUTE ONCE DAILY TABLET, FILM COATED ORAL 1
Qty: 0 | Refills: 0 | Status: ACTIVE | COMMUNITY
Start: 1900-01-01

## 2024-07-15 RX ORDER — MYCOPHENOLIC ACID 180 MG/1
2 TABLETS TABLET, DELAYED RELEASE ORAL TWICE A DAY
Status: ON HOLD | COMMUNITY

## 2024-07-15 RX ORDER — TRAZODONE HYDROCHLORIDE 100 MG/1
TABLET ORAL
Qty: 0 | Refills: 0 | Status: ON HOLD | COMMUNITY
Start: 1900-01-01

## 2024-07-15 RX ORDER — DIPHENHYDRAMINE HYDROCHLORIDE 25 MG/1
1 CAPSULE AT BEDTIME AS NEEDED CAPSULE ORAL ONCE A DAY
Status: ACTIVE | COMMUNITY

## 2024-07-15 RX ORDER — TADALAFIL 5 MG/1
1 TABLET AS NEEDED TABLET, FILM COATED ORAL ONCE A DAY
Status: ACTIVE | COMMUNITY

## 2024-07-15 RX ORDER — ZINC SULFATE 50(220)MG
1 CAPSULE CAPSULE ORAL ONCE A DAY
Status: ACTIVE | COMMUNITY

## 2024-07-15 RX ORDER — OMEPRAZOLE 40 MG/1
1 CAPSULE 30 MINUTES BEFORE MORNING AND EVENING MEALS CAPSULE, DELAYED RELEASE ORAL TWICE A DAY
Qty: 60 | Refills: 3 | Status: ACTIVE | COMMUNITY
Start: 2024-02-09

## 2024-07-15 RX ORDER — OMEPRAZOLE 40 MG/1
1 CAPSULE 30 MINUTES BEFORE MORNING MEAL CAPSULE, DELAYED RELEASE ORAL ONCE A DAY
Qty: 30 | Refills: 1 | Status: ACTIVE | COMMUNITY
Start: 2024-01-16

## 2024-07-15 RX ORDER — DICYCLOMINE HYDROCHLORIDE 20 MG/1
TAKE 1 TABLET TWICE A DAY ORALLY TABLET ORAL BID
Qty: 60 | Refills: 1 | Status: ACTIVE | COMMUNITY

## 2024-07-15 RX ORDER — TACROLIMUS 1 MG/1
AS DIRECTED CAPSULE, GELATIN COATED ORAL
Status: ACTIVE | COMMUNITY

## 2024-07-15 RX ORDER — ATORVASTATIN CALCIUM 20 MG/1
1 TABLET TABLET, FILM COATED ORAL ONCE A DAY
Status: ACTIVE | COMMUNITY

## 2024-07-15 RX ORDER — SPIRONOLACTONE 25 MG/1
1 TABLET TABLET, FILM COATED ORAL
Status: ACTIVE | COMMUNITY